# Patient Record
Sex: FEMALE | Race: WHITE
[De-identification: names, ages, dates, MRNs, and addresses within clinical notes are randomized per-mention and may not be internally consistent; named-entity substitution may affect disease eponyms.]

---

## 2021-05-03 ENCOUNTER — HOSPITAL ENCOUNTER (EMERGENCY)
Dept: HOSPITAL 56 - MW.ED | Age: 31
Discharge: HOME | End: 2021-05-03
Payer: COMMERCIAL
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
## 2021-05-03 DIAGNOSIS — S61.216A: Primary | ICD-10-CM

## 2021-05-03 DIAGNOSIS — Z23: ICD-10-CM

## 2021-05-03 PROCEDURE — 90471 IMMUNIZATION ADMIN: CPT

## 2021-05-03 PROCEDURE — 96372 THER/PROPH/DIAG INJ SC/IM: CPT

## 2021-05-03 PROCEDURE — 90715 TDAP VACCINE 7 YRS/> IM: CPT

## 2021-05-03 PROCEDURE — 99282 EMERGENCY DEPT VISIT SF MDM: CPT

## 2021-05-03 PROCEDURE — 12001 RPR S/N/AX/GEN/TRNK 2.5CM/<: CPT

## 2021-05-03 NOTE — EDM.PDOC
ED HPI GENERAL MEDICAL PROBLEM





- General


Chief Complaint: Skin Complaint


Stated Complaint: RT FINGER INJURY


Time Seen by Provider: 05/03/21 16:41





- History of Present Illness


INITIAL COMMENTS - FREE TEXT/NARRATIVE: 





CHIEF COMPLAINT(S): Hand laceration








HISTORY OF PRESENT ILLNESS: This is a 30-year-old woman in without any 

significant past medical history who is right-hand dominant who comes to the 

emergency department with a chief complaint of hand laceration.  The patient 

states that prior to arrival she was working with some type of wood where it 

landed on her right hand.  She states that she developed a cut on her right 

fourth finger knuckle on the posterior side.  She states that the bleeding did 

stop.  She states that she does not know when her last tetanus shot was.  She 

denies any other pain or decreased range of motion.  She denies any numbness or 

tingling.  She denies any other lacerations or injuries.  She states that she is

experiencing some burning pain where the laceration is rated 6 out of 10.  She 

denies any radiation of this pain.  She states that anytime she touches the area

it is exacerbated.  She denies any relieving factors and has not taken any pain 

medication.





 


REVIEW OF SYSTEMS: 





Cardiovascular: Denies chest pain


Respiratory: Denies shortness of breath


Skin: Positive for laceration to fourth finger on the right side


MSK: Positive for right fourth finger pain


Neurological: Denies numbness, tingling, weakness








PAST MEDICAL HISTORY: As per history of present illness and as reviewed below 

otherwise noncontributory.





SURGICAL HISTORY: As per history of present illness and as reviewed below 

otherwise noncontributory.





SOCIAL HISTORY: As per history of present illness and as reviewed below 

otherwise noncontributory.





FAMILY HISTORY: As per history of present illness and as reviewed below 

otherwise noncontributory.








EXAMINATION OF ORGAN SYSTEMS/BODY AREAS: 





Constitutional: Blood pressure is 133, respiratory rate 17 with an oxygen 

saturation of 99% on room air.  Temperature 36.8


General: Young woman who appears anxious otherwise in no acute distress


Psychiatric: Anxious appearing woman on the verge of tears.  Cooperative.


Eyes: No scleral icterus or conjunctival erythema


Cardiovascular: Regular, rate, and rhythm.  No gallops, murmurs, or rubs.  

Bilateral upper extremity pulses symmetric and intact.  No active bleeding from 

the right fourth digit


Respiratory: Lungs clear to auscultation bilaterally.  No wheezes, rales, or 

rhonchi.


Musculoskeletal: There is full range of motion of the fourth digit on the right.

 There is no deformity.  Full range of motion of all other digits.  No 

anatomical snuffbox tenderness.


Skin: 1 cm laceration overlying the knuckle of the fourth digit on the dorsal 

aspect of the right finger


Neurological:     Alert, GCS 15 distal sensation is intact.








MEDICAL DECISION MAKING AND COURSE IN THE ED WITH INTERPRETATION/REVIEW OF 

DIAGNOSTIC STUDIES: This is a 30-year-old woman without any significant past 

medical history who comes to the emergency department with superficial 

laceration to the patient's right fourth digit without any active bleeding.  At 

this time I do not believe any imaging or labs are indicated.  I did discuss 

digital block with the patient.  We will update the patient's tetanus.  We will 

provide her with Toradol for pain relief.  The patient is tachycardic and 

hypertensive but states that she is anxious and she does appear anxious.  I do 

not believe any intervention is needed at this time.





Laceration Repair Note





Repair of the 1 cm finger wound was done by myself.  Wound was irrigated well 

with saline.  Right fourth digit digital block with lidocaine was performed.  No

foreign bodies were noted.  The wound was repaired with 3 4-0 directed nylon 

sutures.  Wound edges approximated well.  Bacitracin ointment and a sterile 

dressing were applied.





After repair I did discuss with patient that she needed to keep the area clean 

and to return for any pus drainage or surrounding redness.  I discussed that she

needed to have these removed within 7 to 10 days.  She is to return for any new 

or worsening symptoms.  She was amenable discharge at this time and had no 

further questions





DISPOSITION: The patient was discharged home in stable condition. The patient 

will follow up with emergency department or primary care physician in 7 to 10 

days for stitches to be removed





CONDITION: Fair





PROCEDURES: Simple laceration repair





FINAL IMPRESSION(S)/DIAGNOSES: 





1.  Acute right fourth digit laceration status post suture repair





 





Luis Manuel Guerra M.D.


  ** right fourth digit


Pain Score (Numeric/FACES): 6





- Related Data


                                    Allergies











Allergy/AdvReac Type Severity Reaction Status Date / Time


 


No Known Allergies Allergy   Verified 05/03/21 16:37











Home Meds: 


                                    Home Meds





Amphetamine/Dextroamphetamine [Adderall] 20 mg PO DAILY 05/03/21 [History]


tiZANidine [Zanaflex] 4 mg PO BEDTIME 05/03/21 [History]











Past Medical History


HEENT History: Reports: None


Cardiovascular History: Reports: None


Respiratory History: Reports: None


Gastrointestinal History: Reports: None


Genitourinary History: Reports: None


OB/GYN History: Reports: None


Musculoskeletal History: Reports: None


Neurological History: Reports: None


Psychiatric History: Reports: ADD


Endocrine/Metabolic History: Reports: None


Hematologic History: Reports: None


Immunologic History: Reports: None


Oncologic (Cancer) History: Reports: None


Dermatologic History: Reports: None





- Infectious Disease History


Infectious Disease History: Reports: None





- Past Surgical History


Head Surgeries/Procedures: Reports: None


HEENT Surgical History: Reports: Adenoidectomy, Tonsillectomy, Other (See Below)


Other HEENT Surgeries/Procedures: rhinoplasty


Cardiovascular Surgical History: Reports: None


Respiratory Surgical History: Reports: None


GI Surgical History: Reports: None


Female  Surgical History: Reports: None


Endocrine Surgical History: Reports: None


Neurological Surgical History: Reports: None


Musculoskeletal Surgical History: Reports: Other (See Below)


Other Musculoskeletal Surgeries/Procedures:: bunionectomy


Oncologic Surgical History: Reports: None


Dermatological Surgical History: Reports: None





Social & Family History





- Family History


Family Medical History: No Pertinent Family History





- Tobacco Use


Tobacco Use Status *Q: Never Tobacco User


Second Hand Smoke Exposure: No





- Caffeine Use


Caffeine Use: Reports: None





- Recreational Drug Use


Recreational Drug Use: No





ED ROS GENERAL





- Review of Systems


Review Of Systems: See Below





ED EXAM, SKIN/RASH


Exam: See Below





Course





- Vital Signs


Last Recorded V/S: 


                                Last Vital Signs











Temp  36.8 C   05/03/21 16:38


 


Pulse  133 H  05/03/21 16:38


 


Resp  17   05/03/21 16:38


 


BP  146/101 H  05/03/21 16:38


 


Pulse Ox  99   05/03/21 16:38














- Orders/Labs/Meds


Meds: 


Medications














Discontinued Medications














Generic Name Dose Route Start Last Admin





  Trade Name Claudio  PRN Reason Stop Dose Admin


 


Bacitracin  1 dose  05/03/21 17:20  05/03/21 17:23





  Bacitracin Oint 1 Gm U/D Packet  TOP  05/03/21 17:21  1 dose





  ONETIME ONE   Administration


 


Diphtheria/Tetanus/Acell Pertussis  0.5 ml  05/03/21 16:56  05/03/21 17:09





  Diphtheria,Pertussis(Acell),Tetanus Vaccine 0.5 Ml Syringe  IM  05/03/21 16:57

  0.5 ml





  .ONCE ONE   Administration


 


Ketorolac Tromethamine  15 mg  05/03/21 16:56  05/03/21 17:20





  Ketorolac 15 Mg/Ml Sdv  IM  05/03/21 16:57  15 mg





  ONETIME ONE   Administration


 


Lidocaine HCl  5 ml  05/03/21 16:56  05/03/21 17:06





  Lidocaine 1% 5 Ml Sdv  INJECT  05/03/21 16:57  5 ml





  ONETIME ONE   Administration














Departure





- Departure


Time of Disposition: 17:21


Disposition: Home, Self-Care 01


Condition: Fair


Clinical Impression: 


Finger laceration


Qualifiers:


 Encounter type: initial encounter Finger: little finger Damage to nail status: 

without damage Foreign body presence: without foreign body Laterality: right 

Qualified Code(s): S61.216A - Laceration without foreign body of right little 

finger without damage to nail, initial encounter








- Discharge Information


*PRESCRIPTION DRUG MONITORING PROGRAM REVIEWED*: No


*COPY OF PRESCRIPTION DRUG MONITORING REPORT IN PATIENT SITA: No


Instructions:  Sutured Wound Care, Laceration Care, Adult, Easy-to-Read


Referrals: 


Thang Lima MD [Primary Care Provider] - 


Forms:  ED Department Discharge


Additional Instructions: 


You were evaluated today on an emergent basis.  At this time we did place 3 

stitches in your finger.  I do recommend that you keep this area clean with soap

and water and that you return in 7 to 10 days for removal of the stitches.  If 

you have any increased redness or pus drainage please return to the emergency 

department.  I do recommend that you use Tylenol, Motrin, elevate the hand, and 

ice it for symptomatic relief.





Please use: 


Tylenol 500-1000mg every 6 hours (DO NOT TAKE MORE THAN 4000mg in 1 day)


Ibuprofen 400mg every 6 hours (Take with food as it can cause ulcers, GI upset)





Example schedule: 


8:00 AM (Tylenol 500-1000mg)


11:00 AM (Ibuprofen 400mg)


2:00 PM (Tylenol 500-1000mg)


5:00 PM (Ibuprofen 400mg)








Ice the area 5-10 minutes 4 times per day





[facility]                                              

 


[address] [place] [id number]                                                             

               


Phone: [phone]                                                           

            


Fax: [phone]    





[facility]


[address] [place] [id number]                                                             

               


Phone: [phone]                                                           

                                


 Fax: [phone]





The patient is informed of any results of their evaluation and diagnostic workup

and all questions are answered. They are given discharge instructions and return

precautions. The patient is stable for discharge.  The patient states they 

understand and agree with the plan and that they will return if their symptoms 

get worse or if they have any new concerns.





The following information is given to patients seen in the emergency department 

who are being discharged to home. This information is to outline your options 

for follow-up care. We provide all patients seen in our emergency department 

with a follow-up referral.





The need for follow-up, as well as the timing and circumstances, are variable 

depending upon the specifics of your emergency department visit.





If you don't have a primary care physician on staff, we will provide you with a 

referral. We always advise you to contact your personal physician following an 

emergency department visit to inform them of the circumstance of the visit and 

for follow-up with them and/or the need for any referrals to a consulting 

specialist.





The emergency department will also refer you to a specialist when appropriate. 

This referral assures that you have the opportunity for follow-up care with a 

specialist. All of these measure are taken in an effort to provide you with 

optimal care, which includes your follow-up.





Under all circumstances we always encourage you to contact your private 

physician who remains a resource for coordinating your care. When calling for 

follow-up care, please make the office aware that this follow-up is from your 

recent emergency room visit. If for any reason you are refused follow-up, please

contact the [facility] Emergency Department

at [phone] and asked to speak to the emergency department charge nurse.

















Sepsis Event Note (ED)





- Evaluation


Sepsis Screening Result: No Definite Risk